# Patient Record
Sex: MALE | Race: WHITE | Employment: FULL TIME | URBAN - METROPOLITAN AREA
[De-identification: names, ages, dates, MRNs, and addresses within clinical notes are randomized per-mention and may not be internally consistent; named-entity substitution may affect disease eponyms.]

---

## 2024-05-25 ENCOUNTER — APPOINTMENT (OUTPATIENT)
Dept: GENERAL RADIOLOGY | Age: 44
End: 2024-05-25
Payer: COMMERCIAL

## 2024-05-25 ENCOUNTER — HOSPITAL ENCOUNTER (EMERGENCY)
Age: 44
Discharge: HOME | End: 2024-05-25
Attending: EMERGENCY MEDICINE
Payer: COMMERCIAL

## 2024-05-25 VITALS
HEIGHT: 75 IN | WEIGHT: 192 LBS | TEMPERATURE: 98.6 F | RESPIRATION RATE: 18 BRPM | BODY MASS INDEX: 23.87 KG/M2 | OXYGEN SATURATION: 99 % | SYSTOLIC BLOOD PRESSURE: 160 MMHG | HEART RATE: 70 BPM | DIASTOLIC BLOOD PRESSURE: 89 MMHG

## 2024-05-25 DIAGNOSIS — S62.522B: Primary | ICD-10-CM

## 2024-05-25 PROCEDURE — 6370000000 HC RX 637 (ALT 250 FOR IP)

## 2024-05-25 PROCEDURE — 73130 X-RAY EXAM OF HAND: CPT

## 2024-05-25 PROCEDURE — 99283 EMERGENCY DEPT VISIT LOW MDM: CPT

## 2024-05-25 RX ORDER — CEPHALEXIN 500 MG/1
500 CAPSULE ORAL 2 TIMES DAILY
Qty: 14 CAPSULE | Refills: 0 | Status: SHIPPED | OUTPATIENT
Start: 2024-05-25 | End: 2024-06-01

## 2024-05-25 RX ORDER — CEPHALEXIN 500 MG/1
500 CAPSULE ORAL 2 TIMES DAILY
Qty: 14 CAPSULE | Refills: 0 | Status: SHIPPED | OUTPATIENT
Start: 2024-05-25 | End: 2024-05-25

## 2024-05-25 RX ORDER — NAPROXEN 250 MG/1
500 TABLET ORAL ONCE
Status: COMPLETED | OUTPATIENT
Start: 2024-05-25 | End: 2024-05-25

## 2024-05-25 RX ORDER — ACETAMINOPHEN 500 MG
1000 TABLET ORAL
Status: COMPLETED | OUTPATIENT
Start: 2024-05-25 | End: 2024-05-25

## 2024-05-25 RX ORDER — CEPHALEXIN 500 MG/1
500 CAPSULE ORAL ONCE
Status: COMPLETED | OUTPATIENT
Start: 2024-05-25 | End: 2024-05-25

## 2024-05-25 RX ADMIN — NAPROXEN 500 MG: 250 TABLET ORAL at 11:51

## 2024-05-25 RX ADMIN — ACETAMINOPHEN 1000 MG: 500 TABLET ORAL at 11:51

## 2024-05-25 RX ADMIN — CEPHALEXIN 500 MG: 500 CAPSULE ORAL at 12:56
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
## 2024-05-25 NOTE — ED PROVIDER NOTES
ED Attending Attestation Note     Date of evaluation: 5/25/2024    This patient was seen by the resident.  I have seen and examined the patient, agree with the workup, evaluation, management and diagnosis. The care plan has been discussed.  My assessment reveals left thumb laceration.     Suzan Crump MD  05/25/24 1647

## 2024-05-25 NOTE — DISCHARGE INSTRUCTIONS
Today you were seen in the emergency department for a thumb injury. You have a broken bone under your laceration. We applied a splint. We are prescribing you antibiotics, please take as prescribed. You need to see an orthopedic doctor on Tuesday.     To treat your pain at home, I recommend taking either 975mg or 1000mg (three 325mg tablets or two 500mg tablets) of acetaminophen (Tylenol) three times a day. Do not take more than 4,000mg total from all sources of acetaminophen (Midol, Day-Quil, Excedrin, Mucinex, Theraflu etc) in any 24 hour period. You can also take 500mg naproxen (Aleve) twice a day or 600mg of ibuprofen every 4 to 6 hours.     Do not take NSAIDs for more than 3 to 5 days straight, as this can damage the lining of your stomach or rarely your kidneys.       If you have worsening pain or any numbness try loosening the splint like I showed you     Please seek care or return to emergency department if your symptoms worsen or do not resolve. In addition, return if:  -You have a fever (greater than 101 degrees)  -You have chest pain, shortness of breath, abdominal pain, or uncontrollable vomiting  -You are unable to eat or drink  -You pass out  -You have difficulty moving your arms or legs   -You have difficulty speaking or slurred speech  -Or you have any concern that you feel needs urgent physician evaluation  Follow-Up/Future Appointments:  Dameon Lucas MD  [address] [facility]  [address]  [facility] [id number] [phone]    Schedule an appointment as soon as possible for a visit       No future appointments.  Discharge Medications:  New Prescriptions    CEPHALEXIN (KEFLEX) 500 MG CAPSULE    Take 1 capsule by mouth 2 times daily for 7 days       Thank you for allowing me to be a part of your care today - Dr. Guthrie

## 2024-05-28 ENCOUNTER — OFFICE VISIT (OUTPATIENT)
Dept: ORTHOPEDIC SURGERY | Age: 44
End: 2024-05-28

## 2024-05-28 VITALS — HEIGHT: 75 IN | WEIGHT: 192 LBS | BODY MASS INDEX: 23.87 KG/M2

## 2024-05-28 DIAGNOSIS — S62.522A CLOSED DISPLACED FRACTURE OF DISTAL PHALANX OF LEFT THUMB, INITIAL ENCOUNTER: Primary | ICD-10-CM

## 2024-05-28 SDOH — HEALTH STABILITY: PHYSICAL HEALTH: ON AVERAGE, HOW MANY MINUTES DO YOU ENGAGE IN EXERCISE AT THIS LEVEL?: 60 MIN

## 2024-05-28 SDOH — HEALTH STABILITY: PHYSICAL HEALTH: ON AVERAGE, HOW MANY DAYS PER WEEK DO YOU ENGAGE IN MODERATE TO STRENUOUS EXERCISE (LIKE A BRISK WALK)?: 7 DAYS

## 2024-05-29 ENCOUNTER — PREP FOR PROCEDURE (OUTPATIENT)
Dept: ORTHOPEDIC SURGERY | Age: 44
End: 2024-05-29

## 2024-05-29 ENCOUNTER — OFFICE VISIT (OUTPATIENT)
Dept: ORTHOPEDIC SURGERY | Age: 44
End: 2024-05-29
Payer: COMMERCIAL

## 2024-05-29 VITALS — BODY MASS INDEX: 23.87 KG/M2 | WEIGHT: 192 LBS | RESPIRATION RATE: 16 BRPM | HEIGHT: 75 IN

## 2024-05-29 DIAGNOSIS — M79.644 PAIN OF RIGHT THUMB: Primary | ICD-10-CM

## 2024-05-29 PROBLEM — S62.522A CLOSED DISPLACED FRACTURE OF DISTAL PHALANX OF LEFT THUMB: Status: ACTIVE | Noted: 2024-05-29

## 2024-05-29 PROCEDURE — 99204 OFFICE O/P NEW MOD 45 MIN: CPT | Performed by: PHYSICIAN ASSISTANT
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
## 2024-06-04 ENCOUNTER — ANESTHESIA EVENT (OUTPATIENT)
Dept: OPERATING ROOM | Age: 44
End: 2024-06-04
Payer: COMMERCIAL

## 2024-06-04 ENCOUNTER — HOSPITAL ENCOUNTER (OUTPATIENT)
Age: 44
Setting detail: OUTPATIENT SURGERY
Discharge: HOME | End: 2024-06-04
Attending: ORTHOPAEDIC SURGERY | Admitting: ORTHOPAEDIC SURGERY
Payer: COMMERCIAL

## 2024-06-04 ENCOUNTER — APPOINTMENT (OUTPATIENT)
Dept: GENERAL RADIOLOGY | Age: 44
End: 2024-06-04
Attending: ORTHOPAEDIC SURGERY
Payer: COMMERCIAL

## 2024-06-04 ENCOUNTER — ANESTHESIA (OUTPATIENT)
Dept: OPERATING ROOM | Age: 44
End: 2024-06-04
Payer: COMMERCIAL

## 2024-06-04 VITALS
WEIGHT: 195 LBS | SYSTOLIC BLOOD PRESSURE: 133 MMHG | TEMPERATURE: 97.7 F | DIASTOLIC BLOOD PRESSURE: 94 MMHG | BODY MASS INDEX: 24.25 KG/M2 | RESPIRATION RATE: 20 BRPM | HEART RATE: 68 BPM | OXYGEN SATURATION: 98 % | HEIGHT: 75 IN

## 2024-06-04 DIAGNOSIS — S62.522A CLOSED DISPLACED FRACTURE OF DISTAL PHALANX OF LEFT THUMB, INITIAL ENCOUNTER: Primary | ICD-10-CM

## 2024-06-04 PROCEDURE — 7100000000 HC PACU RECOVERY - FIRST 15 MIN: Performed by: ORTHOPAEDIC SURGERY

## 2024-06-04 PROCEDURE — 2580000003 HC RX 258: Performed by: ANESTHESIOLOGY

## 2024-06-04 PROCEDURE — 7100000001 HC PACU RECOVERY - ADDTL 15 MIN: Performed by: ORTHOPAEDIC SURGERY

## 2024-06-04 PROCEDURE — 3600000014 HC SURGERY LEVEL 4 ADDTL 15MIN: Performed by: ORTHOPAEDIC SURGERY

## 2024-06-04 PROCEDURE — 3700000001 HC ADD 15 MINUTES (ANESTHESIA): Performed by: ORTHOPAEDIC SURGERY

## 2024-06-04 PROCEDURE — 6360000002 HC RX W HCPCS: Performed by: NURSE ANESTHETIST, CERTIFIED REGISTERED

## 2024-06-04 PROCEDURE — 73140 X-RAY EXAM OF FINGER(S): CPT

## 2024-06-04 PROCEDURE — 3700000000 HC ANESTHESIA ATTENDED CARE: Performed by: ORTHOPAEDIC SURGERY

## 2024-06-04 PROCEDURE — 2720000010 HC SURG SUPPLY STERILE: Performed by: ORTHOPAEDIC SURGERY

## 2024-06-04 PROCEDURE — 3600000004 HC SURGERY LEVEL 4 BASE: Performed by: ORTHOPAEDIC SURGERY

## 2024-06-04 PROCEDURE — 2709999900 HC NON-CHARGEABLE SUPPLY: Performed by: ORTHOPAEDIC SURGERY

## 2024-06-04 PROCEDURE — 6360000002 HC RX W HCPCS: Performed by: ORTHOPAEDIC SURGERY

## 2024-06-04 PROCEDURE — C1713 ANCHOR/SCREW BN/BN,TIS/BN: HCPCS | Performed by: ORTHOPAEDIC SURGERY

## 2024-06-04 PROCEDURE — 2500000003 HC RX 250 WO HCPCS: Performed by: NURSE ANESTHETIST, CERTIFIED REGISTERED

## 2024-06-04 PROCEDURE — 7100000010 HC PHASE II RECOVERY - FIRST 15 MIN: Performed by: ORTHOPAEDIC SURGERY

## 2024-06-04 PROCEDURE — 7100000011 HC PHASE II RECOVERY - ADDTL 15 MIN: Performed by: ORTHOPAEDIC SURGERY

## 2024-06-04 DEVICE — K WIRE FIX L152MM DIA1.1MM S STL DBL TRCR TIP STYL 1: Type: IMPLANTABLE DEVICE | Site: FINGERS | Status: FUNCTIONAL

## 2024-06-04 RX ORDER — PROPOFOL 10 MG/ML
INJECTION, EMULSION INTRAVENOUS PRN
Status: DISCONTINUED | OUTPATIENT
Start: 2024-06-04 | End: 2024-06-04 | Stop reason: SDUPTHER

## 2024-06-04 RX ORDER — SODIUM CHLORIDE 0.9 % (FLUSH) 0.9 %
5-40 SYRINGE (ML) INJECTION PRN
Status: CANCELLED | OUTPATIENT
Start: 2024-06-04

## 2024-06-04 RX ORDER — SODIUM CHLORIDE 0.9 % (FLUSH) 0.9 %
5-40 SYRINGE (ML) INJECTION EVERY 12 HOURS SCHEDULED
Status: DISCONTINUED | OUTPATIENT
Start: 2024-06-04 | End: 2024-06-04 | Stop reason: HOSPADM

## 2024-06-04 RX ORDER — DIPHENHYDRAMINE HYDROCHLORIDE 50 MG/ML
12.5 INJECTION INTRAMUSCULAR; INTRAVENOUS
Status: CANCELLED | OUTPATIENT
Start: 2024-06-04 | End: 2024-06-05

## 2024-06-04 RX ORDER — SODIUM CHLORIDE 0.9 % (FLUSH) 0.9 %
5-40 SYRINGE (ML) INJECTION EVERY 12 HOURS SCHEDULED
Status: CANCELLED | OUTPATIENT
Start: 2024-06-04

## 2024-06-04 RX ORDER — SODIUM CHLORIDE 0.9 % (FLUSH) 0.9 %
5-40 SYRINGE (ML) INJECTION PRN
Status: DISCONTINUED | OUTPATIENT
Start: 2024-06-04 | End: 2024-06-04 | Stop reason: HOSPADM

## 2024-06-04 RX ORDER — MIDAZOLAM HYDROCHLORIDE 1 MG/ML
INJECTION INTRAMUSCULAR; INTRAVENOUS PRN
Status: DISCONTINUED | OUTPATIENT
Start: 2024-06-04 | End: 2024-06-04 | Stop reason: SDUPTHER

## 2024-06-04 RX ORDER — NALOXONE HYDROCHLORIDE 0.4 MG/ML
INJECTION, SOLUTION INTRAMUSCULAR; INTRAVENOUS; SUBCUTANEOUS PRN
Status: CANCELLED | OUTPATIENT
Start: 2024-06-04

## 2024-06-04 RX ORDER — MEPERIDINE HYDROCHLORIDE 50 MG/ML
12.5 INJECTION INTRAMUSCULAR; INTRAVENOUS; SUBCUTANEOUS EVERY 5 MIN PRN
Status: CANCELLED | OUTPATIENT
Start: 2024-06-04

## 2024-06-04 RX ORDER — KETAMINE HCL IN NACL, ISO-OSM 100MG/10ML
SYRINGE (ML) INJECTION PRN
Status: DISCONTINUED | OUTPATIENT
Start: 2024-06-04 | End: 2024-06-04 | Stop reason: SDUPTHER

## 2024-06-04 RX ORDER — SODIUM CHLORIDE, SODIUM LACTATE, POTASSIUM CHLORIDE, CALCIUM CHLORIDE 600; 310; 30; 20 MG/100ML; MG/100ML; MG/100ML; MG/100ML
INJECTION, SOLUTION INTRAVENOUS CONTINUOUS
Status: DISCONTINUED | OUTPATIENT
Start: 2024-06-04 | End: 2024-06-04 | Stop reason: HOSPADM

## 2024-06-04 RX ORDER — FENTANYL CITRATE 50 UG/ML
INJECTION, SOLUTION INTRAMUSCULAR; INTRAVENOUS PRN
Status: DISCONTINUED | OUTPATIENT
Start: 2024-06-04 | End: 2024-06-04 | Stop reason: SDUPTHER

## 2024-06-04 RX ORDER — BUPIVACAINE HYDROCHLORIDE 5 MG/ML
INJECTION, SOLUTION EPIDURAL; INTRACAUDAL PRN
Status: DISCONTINUED | OUTPATIENT
Start: 2024-06-04 | End: 2024-06-04 | Stop reason: ALTCHOICE

## 2024-06-04 RX ORDER — LIDOCAINE HYDROCHLORIDE 20 MG/ML
INJECTION, SOLUTION EPIDURAL; INFILTRATION; INTRACAUDAL; PERINEURAL PRN
Status: DISCONTINUED | OUTPATIENT
Start: 2024-06-04 | End: 2024-06-04 | Stop reason: SDUPTHER

## 2024-06-04 RX ORDER — OXYCODONE HYDROCHLORIDE 5 MG/1
5 TABLET ORAL PRN
Status: CANCELLED | OUTPATIENT
Start: 2024-06-04 | End: 2024-06-04

## 2024-06-04 RX ORDER — OXYCODONE HYDROCHLORIDE 5 MG/1
10 TABLET ORAL PRN
Status: CANCELLED | OUTPATIENT
Start: 2024-06-04 | End: 2024-06-04

## 2024-06-04 RX ORDER — ONDANSETRON 2 MG/ML
INJECTION INTRAMUSCULAR; INTRAVENOUS PRN
Status: DISCONTINUED | OUTPATIENT
Start: 2024-06-04 | End: 2024-06-04 | Stop reason: SDUPTHER

## 2024-06-04 RX ORDER — DEXAMETHASONE SODIUM PHOSPHATE 4 MG/ML
INJECTION, SOLUTION INTRA-ARTICULAR; INTRALESIONAL; INTRAMUSCULAR; INTRAVENOUS; SOFT TISSUE PRN
Status: DISCONTINUED | OUTPATIENT
Start: 2024-06-04 | End: 2024-06-04 | Stop reason: SDUPTHER

## 2024-06-04 RX ORDER — LABETALOL HYDROCHLORIDE 5 MG/ML
5 INJECTION, SOLUTION INTRAVENOUS EVERY 10 MIN PRN
Status: CANCELLED | OUTPATIENT
Start: 2024-06-04

## 2024-06-04 RX ORDER — SODIUM CHLORIDE 9 MG/ML
INJECTION, SOLUTION INTRAVENOUS PRN
Status: DISCONTINUED | OUTPATIENT
Start: 2024-06-04 | End: 2024-06-04 | Stop reason: HOSPADM

## 2024-06-04 RX ORDER — ONDANSETRON 2 MG/ML
4 INJECTION INTRAMUSCULAR; INTRAVENOUS
Status: CANCELLED | OUTPATIENT
Start: 2024-06-04

## 2024-06-04 RX ORDER — LIDOCAINE HYDROCHLORIDE 10 MG/ML
0.3 INJECTION, SOLUTION EPIDURAL; INFILTRATION; INTRACAUDAL; PERINEURAL
Status: DISCONTINUED | OUTPATIENT
Start: 2024-06-04 | End: 2024-06-04 | Stop reason: HOSPADM

## 2024-06-04 RX ORDER — HYDROCODONE BITARTRATE AND ACETAMINOPHEN 5; 325 MG/1; MG/1
1 TABLET ORAL EVERY 6 HOURS PRN
Qty: 20 TABLET | Refills: 0 | Status: SHIPPED | OUTPATIENT
Start: 2024-06-04 | End: 2024-06-11

## 2024-06-04 RX ORDER — SODIUM CHLORIDE 9 MG/ML
INJECTION, SOLUTION INTRAVENOUS PRN
Status: CANCELLED | OUTPATIENT
Start: 2024-06-04

## 2024-06-04 RX ADMIN — PROPOFOL 200 MG: 10 INJECTION, EMULSION INTRAVENOUS at 12:48

## 2024-06-04 RX ADMIN — FENTANYL CITRATE 100 MCG: 50 INJECTION, SOLUTION INTRAMUSCULAR; INTRAVENOUS at 12:46

## 2024-06-04 RX ADMIN — MIDAZOLAM 2 MG: 1 INJECTION INTRAMUSCULAR; INTRAVENOUS at 12:45

## 2024-06-04 RX ADMIN — SODIUM CHLORIDE, POTASSIUM CHLORIDE, SODIUM LACTATE AND CALCIUM CHLORIDE: 600; 310; 30; 20 INJECTION, SOLUTION INTRAVENOUS at 11:49

## 2024-06-04 RX ADMIN — ONDANSETRON 4 MG: 2 INJECTION INTRAMUSCULAR; INTRAVENOUS at 12:44

## 2024-06-04 RX ADMIN — Medication 25 MG: at 12:53

## 2024-06-04 RX ADMIN — LIDOCAINE HYDROCHLORIDE 80 MG: 20 INJECTION, SOLUTION EPIDURAL; INFILTRATION; INTRACAUDAL; PERINEURAL at 12:48

## 2024-06-04 RX ADMIN — DEXAMETHASONE SODIUM PHOSPHATE 4 MG: 4 INJECTION, SOLUTION INTRAMUSCULAR; INTRAVENOUS at 12:44

## 2024-06-04 RX ADMIN — PROPOFOL 100 MG: 10 INJECTION, EMULSION INTRAVENOUS at 12:53

## 2024-06-04 RX ADMIN — PROPOFOL 100 MG: 10 INJECTION, EMULSION INTRAVENOUS at 12:49

## 2024-06-04 ASSESSMENT — PAIN SCALES - GENERAL
PAINLEVEL_OUTOF10: 0
PAINLEVEL_OUTOF10: 0

## 2024-06-04 ASSESSMENT — PAIN - FUNCTIONAL ASSESSMENT
PAIN_FUNCTIONAL_ASSESSMENT: 0-10

## 2024-06-04 NOTE — PROGRESS NOTES
Date and time of surgery : 6/4/24 at 1300             Arrival Time: 1100      Bring Picture ID and insurance card.  Please wear simple, loose fitting clothing to the hospital.   Do not bring valuables (money, credit cards, checkbooks, etc.)   Do not wear any makeup (including  eye makeup) and no nail polish or artificial nails on your fingers or toes.  DO NOT wear any jewelry or piercings on day of surgery.  All body piercing jewelry must be removed.  If you have dentures, they will be removed before going to the OR; we will provide you a container.  If you wear contact lenses or glasses, they will be removed; please bring a case for them.  Shower the evening before or morning of surgery with antibacterial soap.  Nothing to eat or drink after midnight the day before surgery.   You may brush your teeth and gargle the morning of surgery.  DO NOT SWALLOW WATER.   Do not take any morning meds the day of your surgery.  Aspirin, Ibuprofen, Advil, Naproxen, Vitamin E and other Anti-inflammatory products and supplements should be stopped for 5 -7days before surgery or as directed by your physician.  Do not smoke or drink any alcoholic beverages 24 hours prior to surgery.  This includes NA Beer. Refrain from the usage of any recreational drugs, including non-prescribed prescription drugs.   You MUST plan for a responsible adult to stay on site while you are here and take you home after your surgery. You will not be allowed to leave alone or drive yourself home. It is strongly suggested someone stay with you the first 24 hrs. Your surgery will be cancelled if you do not have a ride home.  To help prevent infection, change your sheets the night before surgery.   If you  have a Living Will and Durable Power of  for Healthcare, please bring in a copy.  Notify your Surgeon if you develop any illness between now and time of surgery. Cough, cold, fever, sore throat, nausea, vomiting, etc.  Please notify your surgeon if 
Advancing hob without compaints  Tolerating Drinking and eating : Attempted to implement non pharmacological methods  of pain management-repositioned/ ice in place   No ponv  Pt alert and appropriate  Respirations  Easy/ unlabored/ no Chest pain or SOB   Surgical drsg unchanged from initial assessment  Circulation checks on operative limb unchanged from last entry in pacu    
Patient arrive in recovery with oral airway in place. Removed at 1332  without difficulty per pt  
Pre and post operative expectations and routines explained to patient, verbalized understanding.  
Pt received from endo/OR suite - accompanied by procedure Rn and CRNA   Pt  - emerging from anesthesia - VSS   Respirations clear / unlabored  Hear rate regular   Resting comfortably   800           ML of Lactated ringers infused on arrival to pacu  
_______Wt.    ________Pharmacy                         _______SCD                      ______TED HOSE    COVID + _______DATE    ___OK per Anesthesia   ____OK per Surgeon

## 2024-06-04 NOTE — ANESTHESIA POSTPROCEDURE EVALUATION
Department of Anesthesiology  Postprocedure Note    Patient: Shin Nascimento  MRN: [id number]  YOB: 1980  Date of evaluation: 6/4/2024    Procedure Summary       Date: 06/04/24 Room / Location: [address]    Anesthesia Start: 1242 Anesthesia Stop: 1314    Procedure: CLOSED REDUCTION AND PERCUTANEOUS PINNING OF LEFT THUMB DISTAL PHALANX FRACTURE (Left: Fingers) Diagnosis:       Closed displaced fracture of distal phalanx of left thumb, initial encounter      (Closed displaced fracture of distal phalanx of left thumb, initial encounter [S62.522A])    Surgeons: Dameon Lucas MD Responsible Provider: Anand Capellan MD    Anesthesia Type: general ASA Status: 1            Anesthesia Type: No value filed.    Vasu Phase I: Vasu Score: 3    Vasu Phase II:      Anesthesia Post Evaluation    Patient location during evaluation: PACU  Patient participation: complete - patient participated  Level of consciousness: awake and alert  Airway patency: patent  Nausea & Vomiting: no vomiting and no nausea  Cardiovascular status: hemodynamically stable and blood pressure returned to baseline  Respiratory status: acceptable  Hydration status: euvolemic  Comments: --------------------            06/04/24               1319     --------------------   BP:       121/60     Pulse:      52       Resp:       20       Temp:                SpO2:      99%      --------------------    Pain management: adequate    No notable events documented.

## 2024-06-04 NOTE — DISCHARGE INSTRUCTIONS
Post-Operative Instructions    Hand & Finger Fracture Repair:    Keep hand strictly elevated with fingers above eye-level to control swelling and pain.    NOTE: If hand is allowed to swell, pain will be very difficult to control.  Keep hand and bandage clean and dry.  Do not change or unwrap bandage.  Please leave bandage in place until your follow-up appointment.  Maintain finger motion by fully straightening and fully bending the exposed fingers at least once an hour (while awake).  This may cause some discomfort, but will not damage surgery.  You should not use your operated hand for any tasks.  NO LIFTING, CARRYING OR HEAVY USE.  You may take the prescribed pain medication as needed  OR   You may take over the counter medication (Tylenol, Advil, Aleve, etc.) but you should not take both together unless otherwise instructed.    Please call the office at [phone]  in 24 - 48 hours to schedule a follow up appointment for 1 - 2 weeks after surgery.  Please call the office at [phone]  if you have any questions or problems.           Dameon Lucas MD          ANESTHESIA DISCHARGE INSTRUCTIONS    You are under the influence of drugs- do not drink alcohol, drive a car, operate machinery(such as power tools, kitchen appliances, etc), sign legal documents, or make any important decisions for 24 hours (or while on pain medications).   Children should not ride bikes or skate boards or play on gym sets  for 24 hours after surgery.  A responsible adult should be with you for 24 hours.  Rest at home today- increase activity as tolerated.  Progress slowly to a regular diet unless your physician has instructed you otherwise. Drink plenty of water.    CALL YOUR DOCTOR IF YOU:  Have moderate to severe nausea or vomiting AND are unable to hold down fluids or prescribed medications.  Have bright red bloody drainage from your dressing that won't stop oozing.  Do not get relief with your pain medication    NORMAL

## 2024-06-04 NOTE — ANESTHESIA PRE PROCEDURE
ROS            Rhythm: regular  Rate: normal                    Neuro/Psych:   Negative Neuro/Psych ROS              GI/Hepatic/Renal: Neg GI/Hepatic/Renal ROS            Endo/Other: Negative Endo/Other ROS                    Abdominal:             Vascular: negative vascular ROS.         Other Findings:       Anesthesia Plan      general     ASA 1       Induction: intravenous.    MIPS: Postoperative opioids intended and Prophylactic antiemetics administered.  Anesthetic plan and risks discussed with patient.      Plan discussed with CRNA.                Shant Lundy MD   6/4/2024

## 2024-06-04 NOTE — H&P
Pre-operative Update of H&P:    I  have seen & examined . Shin Nascimento related solely to his hand and upper extremity conditions, prior to the scheduled procedure on the date of his surgery.  The indications for the planned surgical procedure & and his upper-extremity condition are unchanged.

## 2024-06-04 NOTE — OP NOTE
OPERATIVE REPORT          Patient:  Shin Nascimento    YOB: 1980  Date of Service:  6/4/2024    Location:  [facility]    Preoperative Diagnosis:  Left Thumb Distal Phalanx Articular Base unstable fracture      Postoperative Diagnosis:  Same      Procedure:  Closed reduction and percutaneous pinning of Left Thumb Distal Phalanx articular base fracture    Surgeon:    Dameon Lucas MD    Surgical Assistant:    Hospital Supplied Assistant    Anesthesia:  General         Blood Loss: Minimal    Complications: None                                                           Indications:  Mr. Shin Nascimento  is a 43 y.o. year old male with a Left Thumb Distal Phalanx Base fracture which is unstable.  I have discussed preoperatively with him  the complications, limitations, expectations, alternatives, and risks of surgical care. Mr. Shin Nascimento has provided written informed consent to proceed.    Procedure: After written consent was obtained and the proper site was identified and marked, Mr. Shin Nascimento  was brought to the operating room and placed in the supine position on the operating room table. The Left arm was extended upon a hand table. A dose of preoperative antibiotics was administered and the Left upper extremity was prepped and draped in the usual sterile fashion.    Under fluoroscopic guidance, a closed reduction of the Distal Phalanx fracture was performed with re-alignment of the IP joint surfaces.  To achieve appropriate alignment, the IP joint required a moderate amount of hyperextension.  one 0.045\" K-wires were percutaneously placed securing the fracture fragments in anatomic alignment, assuring that there was no distraction or malrotation. Final fluoroscopic images demonstrated anatomic alignment and appropriate reduction of the fracture fragments. The K-wires were checked for appropriate position and dimension. The wire ends were bent, cut short, & protective caps

## 2024-06-05 ENCOUNTER — TELEPHONE (OUTPATIENT)
Dept: ORTHOPEDIC SURGERY | Age: 44
End: 2024-06-05

## 2024-06-05 NOTE — TELEPHONE ENCOUNTER
Left a vm for the patient.   I let him know that we can see him 6/17 at [facility] with Suzan or he can keep his current appointment for the 19th.  It is ok for the patient to wait till the 19th to be seen.

## 2024-06-05 NOTE — TELEPHONE ENCOUNTER
Other PATIENT IS REQUESTING TO BE SEEN SOONER THAN TWO WEEK POST OP. PATIENT SAYS DR WEST WANTED TO SEE HIM IN A WEEK. [phone]

## 2024-06-19 ENCOUNTER — OFFICE VISIT (OUTPATIENT)
Dept: ORTHOPEDIC SURGERY | Age: 44
End: 2024-06-19
Payer: COMMERCIAL

## 2024-06-19 VITALS — WEIGHT: 195 LBS | BODY MASS INDEX: 24.25 KG/M2 | HEIGHT: 75 IN

## 2024-06-19 DIAGNOSIS — S62.522A CLOSED DISPLACED FRACTURE OF DISTAL PHALANX OF LEFT THUMB, INITIAL ENCOUNTER: Primary | ICD-10-CM

## 2024-06-19 PROCEDURE — 29075 APPL CST ELBW FNGR SHORT ARM: CPT | Performed by: PHYSICIAN ASSISTANT

## 2024-06-19 PROCEDURE — 99024 POSTOP FOLLOW-UP VISIT: CPT | Performed by: PHYSICIAN ASSISTANT

## 2024-06-19 NOTE — PROGRESS NOTES
Mr. Shin Nascimento returns today in follow-up of his recent left Thumb Distal Phalanx Fracture Repair which was done 2 weeks ago.  He has noted decreased discomfort and decreased swelling.    He notes no symptoms of numbness, tingling, no symptoms related to perfusion.    Physical Exam:  Skin incisions & pin sites are healing well, without erythema, drainage or sign of infection.   Digital range of motion is not assessed due to the presence of the un-healed fracture.  Wrist range of motion is mildly stiff from immobilization.  Sensation is normal in the Whole Hand.  Vascular examination reveals normal, good capillary refill, and good color.  Swelling is minimal.  Clinical alignment and rotation are normal.      Radiographic Evaluation:  Radiographs were obtained today (2 views of the left  Thumb).  They demonstrate good maintenance of alignment of the fracture fragments, no rotational deformity, & mild amount of interval healing of the fracture.  There has not been evidence of hardware loosening or failure.  The fracture does not appear to be healed at this time.    Impression:  Mr. Shin Nascimento is doing well after recent left Thumb  Distal Phalanx  Fracture Repair.  It would appear that he has not fully healed his fracture.    Plan:  Mr. Shin Nascimento is today returned to an appropriately applied well padded cast incorporating the necessary digits in an intrinsic-safe position.  He is advised regarding the appropriate care of, wear, and use of this device.   He is also instructed in  work on Active & Passive range of motion of the exposed digits & elbow.  These modalities were demonstrated to him today.  We discussed the continued restrictions on the use of the injured hand & wrist and the limitations on resumption of activities.    We discussed the option of pursuing formalized hand therapy and a prescription  was not indicated.    I have asked Mr. Shin Nascimento to follow-up with me in approximately 2

## 2024-06-19 NOTE — PROGRESS NOTES
I applied a Short Arm Thumb-Spica Fiberglass Cast to Shin Nascimento's Left, Thumb.  I applied casting stockinette,  1 rolls of padding in an overlapping fashion.  Shin Nascimento requested Black color fiberglass.  I rolled 2 rolls of fiberglass in an overlapping fashion.  His  Left, Thumb, Wrist was maintained in a 0 degree Neutral Alignment.  At the conclusion of the procedure, Shin Nascimento's nail beds were pink in color, the extremity is warm to the touch.  Capillary refill is less than 2 seconds.  Shin Nascimento was instructed in proper care of cast.  Do not get wet, keep all items out of cast.  If cast is painful please make appointment to get checked.  He was also briefed on circulation compromise.  If digits are cold, blue, and tingling patient must must seek care.  If after hours patient is to go to Emergency Room.  During office hours patient must come in to office.

## 2024-07-08 ENCOUNTER — OFFICE VISIT (OUTPATIENT)
Dept: ORTHOPEDIC SURGERY | Age: 44
End: 2024-07-08

## 2024-07-08 VITALS — WEIGHT: 195 LBS | BODY MASS INDEX: 24.25 KG/M2 | HEIGHT: 75 IN

## 2024-07-08 DIAGNOSIS — S62.522A CLOSED DISPLACED FRACTURE OF DISTAL PHALANX OF LEFT THUMB, INITIAL ENCOUNTER: Primary | ICD-10-CM

## 2024-07-08 PROCEDURE — 99024 POSTOP FOLLOW-UP VISIT: CPT | Performed by: PHYSICIAN ASSISTANT

## 2024-07-08 NOTE — PROGRESS NOTES
is also instructed in  work on Active & Passive range of motion of the digits, wrist, & elbow.  These modalities were demonstrated to him today.  We discussed the gradual return to use of the injured hand & wrist and the cautious resumption of activities.    We discussed the option of pursuing formalized hand therapy and a prescription  was not indicated.    I have asked Mr. Shin Nascimento to follow-up with me in approximately 4 weeks from now for re-evaluation.      He is also specifically instructed to return to the office or call for an appointment sooner if his symptoms are changing or worsening prior to that time.

## (undated) DEVICE — TOWEL,OR,DSP,ST,BLUE,STD,8/PK,10PK/CS: Brand: MEDLINE

## (undated) DEVICE — Device

## (undated) DEVICE — UNDERGLOVE SURG SZ 8 FNGR THK0.21MIL GRN LTX BEAD CUF

## (undated) DEVICE — UNDERPAD HOSP W30XL36IN WHT SUP ABSRB POLYMER AIR PERM DISP

## (undated) DEVICE — WILLIS PACK: Brand: MEDLINE INDUSTRIES, INC.

## (undated) DEVICE — GLOVE ORANGE PI 7   MSG9070

## (undated) DEVICE — PADDING UNDERCAST W4INXL4YD 100% COT CRIMPED FINISH WBRL II

## (undated) DEVICE — SOLUTION IV IRRIG 500ML 0.9% SODIUM CHL 2F7123

## (undated) DEVICE — TOWEL,OR,DSP,ST,BLUE,STD,4/PK,20PK/CS: Brand: MEDLINE

## (undated) DEVICE — Z DISCONTINUED USE 2858462 SPLINT ORTH W4XL30IN LAYERED FBRGLS FOAM PD BRTH BK MOLD

## (undated) DEVICE — PADDING CAST W4INXL4YD NONSTERILE COT RAYON MICROPLEATED

## (undated) DEVICE — COTTON UNDERCAST PADDING,REGULAR FINISH: Brand: WEBRIL

## (undated) DEVICE — BANDAGE COMPR W4INXL12FT E DISP ESMARCH EVEN